# Patient Record
Sex: FEMALE | Race: WHITE | NOT HISPANIC OR LATINO | Employment: FULL TIME | URBAN - METROPOLITAN AREA
[De-identification: names, ages, dates, MRNs, and addresses within clinical notes are randomized per-mention and may not be internally consistent; named-entity substitution may affect disease eponyms.]

---

## 2017-06-14 ENCOUNTER — APPOINTMENT (OUTPATIENT)
Dept: RADIOLOGY | Facility: CLINIC | Age: 42
End: 2017-06-14

## 2017-06-14 ENCOUNTER — ALLSCRIPTS OFFICE VISIT (OUTPATIENT)
Dept: OTHER | Facility: OTHER | Age: 42
End: 2017-06-14

## 2017-06-14 DIAGNOSIS — M25.569 PAIN IN KNEE: ICD-10-CM

## 2017-06-14 PROCEDURE — 73562 X-RAY EXAM OF KNEE 3: CPT

## 2017-06-14 PROCEDURE — 73560 X-RAY EXAM OF KNEE 1 OR 2: CPT

## 2017-06-28 ENCOUNTER — ALLSCRIPTS OFFICE VISIT (OUTPATIENT)
Dept: OTHER | Facility: OTHER | Age: 42
End: 2017-06-28

## 2017-07-13 ENCOUNTER — ALLSCRIPTS OFFICE VISIT (OUTPATIENT)
Dept: OTHER | Facility: OTHER | Age: 42
End: 2017-07-13

## 2017-07-13 ENCOUNTER — APPOINTMENT (OUTPATIENT)
Dept: RADIOLOGY | Facility: MEDICAL CENTER | Age: 42
End: 2017-07-13

## 2017-07-13 DIAGNOSIS — M25.569 PAIN IN KNEE: ICD-10-CM

## 2017-07-13 PROCEDURE — 73562 X-RAY EXAM OF KNEE 3: CPT

## 2017-10-26 ENCOUNTER — TRANSCRIBE ORDERS (OUTPATIENT)
Dept: ADMINISTRATIVE | Facility: HOSPITAL | Age: 42
End: 2017-10-26

## 2017-10-26 DIAGNOSIS — Z12.39 SCREENING BREAST EXAMINATION: Primary | ICD-10-CM

## 2017-11-03 ENCOUNTER — HOSPITAL ENCOUNTER (OUTPATIENT)
Dept: RADIOLOGY | Facility: HOSPITAL | Age: 42
Discharge: HOME | End: 2017-11-03
Payer: COMMERCIAL

## 2017-11-03 DIAGNOSIS — Z12.39 SCREENING BREAST EXAMINATION: ICD-10-CM

## 2017-11-03 PROCEDURE — G0202 SCR MAMMO BI INCL CAD: HCPCS

## 2018-01-13 VITALS
HEIGHT: 63 IN | WEIGHT: 140 LBS | SYSTOLIC BLOOD PRESSURE: 101 MMHG | BODY MASS INDEX: 24.8 KG/M2 | HEART RATE: 89 BPM | DIASTOLIC BLOOD PRESSURE: 69 MMHG

## 2018-01-14 VITALS
SYSTOLIC BLOOD PRESSURE: 125 MMHG | DIASTOLIC BLOOD PRESSURE: 78 MMHG | WEIGHT: 140 LBS | BODY MASS INDEX: 24.8 KG/M2 | HEIGHT: 63 IN | HEART RATE: 79 BPM

## 2018-01-14 VITALS
HEART RATE: 83 BPM | SYSTOLIC BLOOD PRESSURE: 117 MMHG | HEIGHT: 63 IN | DIASTOLIC BLOOD PRESSURE: 78 MMHG | WEIGHT: 140 LBS | BODY MASS INDEX: 24.8 KG/M2

## 2018-12-28 ENCOUNTER — OFFICE VISIT (OUTPATIENT)
Dept: URGENT CARE | Facility: CLINIC | Age: 43
End: 2018-12-28
Payer: COMMERCIAL

## 2018-12-28 VITALS
HEART RATE: 94 BPM | BODY MASS INDEX: 22.5 KG/M2 | SYSTOLIC BLOOD PRESSURE: 100 MMHG | WEIGHT: 127 LBS | HEIGHT: 63 IN | TEMPERATURE: 97.3 F | DIASTOLIC BLOOD PRESSURE: 68 MMHG | RESPIRATION RATE: 19 BRPM | OXYGEN SATURATION: 99 %

## 2018-12-28 DIAGNOSIS — J01.90 ACUTE NON-RECURRENT SINUSITIS, UNSPECIFIED LOCATION: Primary | ICD-10-CM

## 2018-12-28 PROCEDURE — 99213 OFFICE O/P EST LOW 20 MIN: CPT | Performed by: PHYSICIAN ASSISTANT

## 2018-12-28 RX ORDER — SULFAMETHOXAZOLE AND TRIMETHOPRIM 800; 160 MG/1; MG/1
1 TABLET ORAL EVERY 12 HOURS SCHEDULED
Qty: 20 TABLET | Refills: 0 | Status: SHIPPED | OUTPATIENT
Start: 2018-12-28 | End: 2019-01-07

## 2018-12-28 RX ORDER — BUPROPION HYDROCHLORIDE 150 MG/1
TABLET, EXTENDED RELEASE ORAL
COMMUNITY

## 2018-12-28 NOTE — LETTER
December 28, 2018     Patient: Lauro Roe   YOB: 1975   Date of Visit: 12/28/2018       To Whom it May Concern:    Ann Marie Ramires was seen in my clinic on 12/28/2018  She may return to work on 12/29/2018  If you have any questions or concerns, please don't hesitate to call           Sincerely,          Daniel Zaragoza PA-C        CC: No Recipients

## 2018-12-28 NOTE — PROGRESS NOTES
[facility]        NAME: Kristina Flores is a 41 y o  female  : 1975    MRN: [id number]  DATE: 2019  TIME: 10:27 PM    Assessment and Plan   Acute non-recurrent sinusitis, unspecified location [J01 90]  1  Acute non-recurrent sinusitis, unspecified location  sulfamethoxazole-trimethoprim (BACTRIM DS) 800-160 mg per tablet         Patient Instructions     Discussed condition with pt  She has worsening sinus symptoms despite adequate OTC symptomatic management and she is a smoker  I will treat her for sinusitis with a course of Bactrim DS and rec hydration, rest, continuing with symptomatic management, and observation  Follow up with PCP in 3-5 days  Proceed to  ER if symptoms worsen  Chief Complaint     Chief Complaint   Patient presents with    Sinusitis     Pt reports of sinus congestion with pain and cough  S/S started approx 4 days ago  History of Present Illness       Pt presents with onset 4 days ago of what started as runny nose and HA and now HA has worsened  Tried Advil/Motrin/Aleve with no relief of HA  She has sinus pressure, dizziness, nasal congestion, PND, productive cough  Had sweats last night but denies fever, chills, N/V/D  Has also taken Mucinex, Sudafed Cold/Sinus, and Flonase  Has allergies and asthma  Has rescue inhaler to use PRN  Smokes 1/4 ppd  She has received the flu shot  Review of Systems   Review of Systems   Constitutional: Positive for diaphoresis  Negative for chills and fever  HENT: Positive for congestion, postnasal drip, sinus pain, sinus pressure and sore throat  Respiratory: Positive for cough  Cardiovascular: Negative  Gastrointestinal: Negative  Genitourinary: Negative  Neurological: Positive for headaches           Current Medications       Current Outpatient Prescriptions:     buPROPion (WELLBUTRIN SR) 150 mg 12 hr tablet, Take by mouth, Disp: , Rfl:     sulfamethoxazole-trimethoprim (BACTRIM DS) 800-160 mg per tablet, Take 1 tablet by mouth every 12 (twelve) hours for 10 days Take with food  , Disp: 20 tablet, Rfl: 0    Current Allergies     Allergies as of 12/28/2018 - Reviewed 12/28/2018   Allergen Reaction Noted    Penicillin v  06/14/2017            The following portions of the patient's history were reviewed and updated as appropriate: allergies, current medications, past family history, past medical history, past social history, past surgical history and problem list      History reviewed  No pertinent past medical history  History reviewed  No pertinent surgical history  History reviewed  No pertinent family history  Medications have been verified  Objective   /68   Pulse 94   Temp (!) 97 3 °F (36 3 °C)   Resp 19   Ht 5' 3" (1 6 m)   Wt 57 6 kg (127 lb)   LMP 12/17/2018 (LMP Unknown)   SpO2 99%   BMI 22 50 kg/m²        Physical Exam     Physical Exam   Constitutional: She is oriented to person, place, and time  She appears well-developed and well-nourished  No distress  Neurological: She is alert and oriented to person, place, and time  Psychiatric: She has a normal mood and affect  Vitals reviewed

## 2019-01-31 ENCOUNTER — OFFICE VISIT (OUTPATIENT)
Dept: URGENT CARE | Facility: CLINIC | Age: 44
End: 2019-01-31
Payer: COMMERCIAL

## 2019-01-31 VITALS
HEIGHT: 63 IN | OXYGEN SATURATION: 100 % | HEART RATE: 70 BPM | TEMPERATURE: 97.4 F | BODY MASS INDEX: 22.64 KG/M2 | RESPIRATION RATE: 18 BRPM | WEIGHT: 127.8 LBS | SYSTOLIC BLOOD PRESSURE: 100 MMHG | DIASTOLIC BLOOD PRESSURE: 60 MMHG

## 2019-01-31 DIAGNOSIS — J01.90 ACUTE RHINOSINUSITIS: Primary | ICD-10-CM

## 2019-01-31 PROBLEM — S80.02XA PATELLAR CONTUSION, LEFT, INITIAL ENCOUNTER: Status: ACTIVE | Noted: 2017-06-14

## 2019-01-31 PROBLEM — M25.569 KNEE PAIN: Status: ACTIVE | Noted: 2017-06-14

## 2019-01-31 PROCEDURE — 99213 OFFICE O/P EST LOW 20 MIN: CPT | Performed by: PHYSICIAN ASSISTANT

## 2019-01-31 RX ORDER — LORATADINE 10 MG/1
10 TABLET ORAL DAILY PRN
COMMUNITY

## 2019-01-31 RX ORDER — BIOTIN 10 MG
2 TABLET ORAL DAILY
COMMUNITY

## 2019-01-31 RX ORDER — FLUTICASONE PROPIONATE 50 MCG
1 SPRAY, SUSPENSION (ML) NASAL DAILY PRN
COMMUNITY

## 2019-01-31 RX ORDER — BIOTIN 1 MG
TABLET ORAL DAILY
COMMUNITY

## 2019-01-31 NOTE — PATIENT INSTRUCTIONS
Continue a decongestant such as Mucinex or Sudafed  Continue use of Flonase  Motrin or Tylenol may be used for fever and discomfort  Check your package labeling as some decongestants already contain these medications  Begin using a Neti pot as directed  Apply a warm compress to your face, jaw, and right shoulder for discomfort relief  Massage the shoulder to help release the muscle spasm  Use a cool mist humidifier at bedtime, turning on hours prior to bed with bedroom door shut for maximum relief  Follow up with your family doctor in 3-5 days  Proceed to the ER if symptoms worsen

## 2019-01-31 NOTE — LETTER
January 31, 2019     Patient: Sushil Warner   YOB: 1975   Date of Visit: 1/31/2019       To Whom It May Concern: It is my medical opinion that Krysta Miller may return to work on 02/01/2019  If you have any questions or concerns, please don't hesitate to call           Sincerely,    Coni Moreno PA-C

## 2019-01-31 NOTE — PROGRESS NOTES
[facility]        NAME: Ethan Jeong is a 41 y o  female  : 1975    MRN: [id number]  DATE: 2019  TIME: 9:02 AM    Assessment and Plan   Acute rhinosinusitis [J01 90]  1  Acute rhinosinusitis       Patient Instructions   Continue a decongestant such as Mucinex or Sudafed  Continue use of Flonase  Motrin or Tylenol may be used for fever and discomfort  Check your package labeling as some decongestants already contain these medications  Begin using a Neti pot as directed  Apply a warm compress to your face, jaw, and right shoulder for discomfort relief  Massage the shoulder to help release the muscle spasm  Use a cool mist humidifier at bedtime, turning on hours prior to bed with bedroom door shut for maximum relief  Follow up with your family doctor in 3-5 days  Proceed to the ER if symptoms worsen  Advised could take an allergy tablet  The diagnosis, viral etiology, expected course of illness, and treatment plan were reviewed  All questions answered  Precautions given  Patient verbalized understanding and agreement the treatment plan  Chief Complaint     Chief Complaint   Patient presents with    Cold Like Symptoms     x several days - nasal congestion with PND, facial pain (R jaw/teeth), HA, occ  dy cough  vomited x 1 (mucous)  Taking Motrin, Flonase, Mucinex DM and Sudafed PE    Facial Pain     History of Present Illness   40 y/o female presentign with c/o sinus infection x 4 days  She notes facial pressure behind the right eye, pressure in b/l cheeks with bending down, nasal congestion, mild runny nose, PND, dry cough, and a single episode of vomitted yesterday  She notes vomiting was likely related to a significant amount of PND and mucus swallowed early in the am  She notes seh vomited just once with mild preceding nausea  No further nausea, vomiting, or change in appetite  Treating with Sudafed, Mucinex, saline spray, and Flonase   Family sick at home with similar symptoms  No recent travel  Had flu shot  Smoker of 3-4 cigarettes per day  Patient does note a history of fall, spring, and summer seasonal allergies  She is wondering if she should begin taking an allergy tablet noting that she has been sick more frequently this winter and has lingering symptoms even when feeling better  Review of Systems   Review of Systems   Constitutional: Positive for diaphoresis and fatigue  Negative for appetite change, chills and fever  Respiratory: Negative for shortness of breath and wheezing  Cardiovascular: Negative for chest pain and palpitations  Gastrointestinal: Negative for abdominal pain, diarrhea and nausea  Musculoskeletal: Positive for myalgias (right shoulder  )  Negative for arthralgias  Skin: Negative for rash  Neurological: Negative for dizziness and light-headedness       Current Medications       Current Outpatient Prescriptions:     buPROPion (WELLBUTRIN SR) 150 mg 12 hr tablet, Take by mouth, Disp: , Rfl:     Cholecalciferol (VITAMIN D3) 1000 units CAPS, Take by mouth daily, Disp: , Rfl:     fluticasone (FLONASE) 50 mcg/act nasal spray, 1 spray into each nostril daily as needed for rhinitis, Disp: , Rfl:     loratadine (CLARITIN) 10 mg tablet, Take 10 mg by mouth daily as needed for allergies, Disp: , Rfl:     Multiple Vitamins-Minerals (HAIR SKIN AND NAILS FORMULA PO), Take by mouth daily, Disp: , Rfl:     Multiple Vitamins-Minerals (MULTIVITAMIN ADULT) CHEW, Chew 2 tablets daily, Disp: , Rfl:     Current Allergies     Allergies as of 01/31/2019 - Reviewed 01/31/2019   Allergen Reaction Noted    Penicillin v Hives 06/14/2017            The following portions of the patient's history were reviewed and updated as appropriate: allergies, current medications, past family history, past medical history, past social history, past surgical history and problem list      Past Medical History:   Diagnosis Date    Allergic rhinitis     Migraine  Vitamin D deficiency        Past Surgical History:   Procedure Laterality Date    LAPAROSCOPY FOR ECTOPIC PREGNANCY       No family history on file  Medications have been verified  Objective   /60 (BP Location: Left arm, Patient Position: Sitting, Cuff Size: Standard)   Pulse 70   Temp (!) 97 4 °F (36 3 °C) (Tympanic)   Resp 18   Ht 5' 3" (1 6 m)   Wt 58 kg (127 lb 12 8 oz)   LMP 12/23/2018 (Exact Date)   SpO2 100%   BMI 22 64 kg/m²      Physical Exam     Physical Exam   Constitutional: She is oriented to person, place, and time  Vital signs are normal  She appears well-developed and well-nourished  She is cooperative  She appears ill  No distress  HENT:   Head: Normocephalic and atraumatic  Right Ear: Hearing, external ear and ear canal normal  A middle ear effusion is present  Left Ear: Hearing, external ear and ear canal normal  A middle ear effusion is present  Nose: Rhinorrhea present  No mucosal edema  Mouth/Throat: Uvula is midline, oropharynx is clear and moist and mucous membranes are normal  Mucous membranes are not pale, not dry and not cyanotic  No oral lesions  No uvula swelling  No oropharyngeal exudate, posterior oropharyngeal edema, posterior oropharyngeal erythema or tonsillar abscesses  Erythematous nasal mucosa  Eyes: Conjunctivae are normal  Right eye exhibits no discharge  Left eye exhibits no discharge  No scleral icterus  Neck: Phonation normal  No neck rigidity  No edema and no erythema present  Cardiovascular: Normal rate, regular rhythm and normal heart sounds  Exam reveals no gallop and no friction rub  No murmur heard  Pulmonary/Chest: Effort normal and breath sounds normal  No stridor  No respiratory distress  She has no decreased breath sounds  She has no wheezes  She has no rhonchi  She has no rales  Abdominal: Soft  Bowel sounds are normal  She exhibits no distension and no mass  There is no tenderness  There is no guarding  Lymphadenopathy:     She has no cervical adenopathy  Neurological: She is alert and oriented to person, place, and time  No cranial nerve deficit  She exhibits normal muscle tone  Coordination normal    Skin: Skin is warm and dry  No rash noted  She is not diaphoretic  Psychiatric: She has a normal mood and affect  Her behavior is normal    Nursing note and vitals reviewed

## 2019-02-08 ENCOUNTER — TRANSCRIBE ORDERS (OUTPATIENT)
Dept: ADMINISTRATIVE | Facility: HOSPITAL | Age: 44
End: 2019-02-08

## 2019-02-08 DIAGNOSIS — Z12.39 SCREENING BREAST EXAMINATION: Primary | ICD-10-CM

## 2019-02-14 ENCOUNTER — HOSPITAL ENCOUNTER (OUTPATIENT)
Dept: RADIOLOGY | Facility: HOSPITAL | Age: 44
Discharge: HOME | End: 2019-02-14
Payer: COMMERCIAL

## 2019-02-14 VITALS — HEIGHT: 63 IN | WEIGHT: 127 LBS | BODY MASS INDEX: 22.5 KG/M2

## 2019-02-14 DIAGNOSIS — Z12.39 SCREENING BREAST EXAMINATION: ICD-10-CM

## 2019-02-14 PROCEDURE — 77067 SCR MAMMO BI INCL CAD: CPT

## 2019-02-14 PROCEDURE — 77063 BREAST TOMOSYNTHESIS BI: CPT

## 2019-06-30 ENCOUNTER — OFFICE VISIT (OUTPATIENT)
Dept: URGENT CARE | Facility: CLINIC | Age: 44
End: 2019-06-30
Payer: COMMERCIAL

## 2019-06-30 VITALS
TEMPERATURE: 98.7 F | HEART RATE: 82 BPM | BODY MASS INDEX: 21.83 KG/M2 | OXYGEN SATURATION: 100 % | SYSTOLIC BLOOD PRESSURE: 100 MMHG | HEIGHT: 63 IN | WEIGHT: 123.2 LBS | DIASTOLIC BLOOD PRESSURE: 60 MMHG | RESPIRATION RATE: 18 BRPM

## 2019-06-30 DIAGNOSIS — J01.00 ACUTE NON-RECURRENT MAXILLARY SINUSITIS: Primary | ICD-10-CM

## 2019-06-30 PROCEDURE — 99214 OFFICE O/P EST MOD 30 MIN: CPT | Performed by: NURSE PRACTITIONER

## 2019-06-30 RX ORDER — DOXYCYCLINE HYCLATE 100 MG/1
100 CAPSULE ORAL EVERY 12 HOURS SCHEDULED
Qty: 20 CAPSULE | Refills: 0 | Status: SHIPPED | OUTPATIENT
Start: 2019-06-30 | End: 2019-07-10

## 2019-08-28 ENCOUNTER — OFFICE VISIT (OUTPATIENT)
Dept: URGENT CARE | Facility: CLINIC | Age: 44
End: 2019-08-28
Payer: COMMERCIAL

## 2019-08-28 VITALS
HEART RATE: 63 BPM | TEMPERATURE: 97 F | WEIGHT: 129.8 LBS | SYSTOLIC BLOOD PRESSURE: 105 MMHG | DIASTOLIC BLOOD PRESSURE: 64 MMHG | BODY MASS INDEX: 23 KG/M2 | RESPIRATION RATE: 16 BRPM | HEIGHT: 63 IN | OXYGEN SATURATION: 100 %

## 2019-08-28 DIAGNOSIS — B97.89 VIRAL SINUSITIS: ICD-10-CM

## 2019-08-28 DIAGNOSIS — J32.9 VIRAL SINUSITIS: ICD-10-CM

## 2019-08-28 DIAGNOSIS — G44.209 ACUTE NON INTRACTABLE TENSION-TYPE HEADACHE: Primary | ICD-10-CM

## 2019-08-28 PROCEDURE — 99213 OFFICE O/P EST LOW 20 MIN: CPT | Performed by: PHYSICIAN ASSISTANT

## 2019-08-28 RX ORDER — KETOROLAC TROMETHAMINE 30 MG/ML
30 INJECTION, SOLUTION INTRAMUSCULAR; INTRAVENOUS ONCE
Status: COMPLETED | OUTPATIENT
Start: 2019-08-28 | End: 2019-08-28

## 2019-08-28 RX ADMIN — KETOROLAC TROMETHAMINE 30 MG: 30 INJECTION, SOLUTION INTRAMUSCULAR; INTRAVENOUS at 08:51

## 2019-08-28 NOTE — LETTER
August 28, 2019     Patient: Tere Haile   YOB: 1975   Date of Visit: 8/28/2019       To Whom It May Concern: It is my medical opinion that Marion Jorge may return to work on 8/30/2019 or sooner if feeling better  If you have any questions or concerns, please don't hesitate to call           Sincerely,        Angel Edgar PA-C

## 2019-08-28 NOTE — PATIENT INSTRUCTIONS
Continue ibuprofen or Tylenol for discomfort relief  Your next dose of ibuprofen may be taken at 6:00 p m  today  Begin taking Mucinex for nasal congestion  Continue to apply ice or heat to your face and neck for discomfort relief  After heat application, massage and stretch your neck as reviewed  Get plenty of rest and continue to drink lots of water  Follow up with your family doctor in 3-5 days  Proceed to the ER with change in vision, change in speech, weakness, or if symptoms worsen

## 2019-08-28 NOTE — PROGRESS NOTES
[facility]        NAME: Dioni Castañeda is a 41 y o  female  : 1975    MRN: [id number]  DATE: 2019  TIME: 9:04 AM    Assessment and Plan   Acute non intractable tension-type headache [G44 209]  1  Acute non intractable tension-type headache  ketorolac (TORADOL) injection 30 mg   2  Viral sinusitis       Patient Instructions   Continue ibuprofen or Tylenol for discomfort relief  Your next dose of ibuprofen may be taken at 6:00 p m  today  Begin taking Mucinex for nasal congestion  Continue to apply ice or heat to your face and neck for discomfort relief  After heat application, massage and stretch your neck as reviewed  Get plenty of rest and continue to drink lots of water  Follow up with your family doctor in 3-5 days  Proceed to the ER with change in vision, change in speech, weakness, or if symptoms worsen  The diagnosis, etiology, expected course of illness, and treatment plan were reviewed  All questions answered  Precautions given  Patient verbalized understanding and agreement with the treatment plan  Chief Complaint     Chief Complaint   Patient presents with    Facial Pain     has facial, neck, head pain since yesterday tried OTC meds  stayed in bed yesterday     History of Present Illness     45-year-old female presenting with complaint of headache x 1 5 days  HA is described as a heavy pressure and tightness over her face, extending to back of head  It does not radiate  She reports symptoms of neck muscle stifness and spasm, off balance with first standing--relieved within seconds, sensitivity to light, fatigue, and malaise  Yesterday developed nasal congestion, sinus pressure, and sweats  No F/C/S, ear pain, RN, sore throat, cough, abd pain, N/V  No sensitivity to noise, change in vision, numbness, tingling, or weakness   She has attempted treating with ice and heat application to the face and neck with some relief, as well as with Aleve, Advil cold and sinus, Excedrin Migraine, with decrease in severity of pain  She reports very brief to no resolution of HA since onset  Reports a hx of migraines, with frequency decreased significantly since starting Wellbutrin for smoking sensation  She is hydrating with 6-8 glasses of water daily, and notes increased sleep since sx onset  No sick contacts  No recent travel  Nonsmoker  Review of Systems   Review of Systems   Constitutional: Positive for appetite change, diaphoresis and fatigue  Negative for chills and fever  HENT: Positive for congestion  Negative for ear pain, rhinorrhea, sneezing and sore throat  Respiratory: Negative for cough, shortness of breath and wheezing  Cardiovascular: Negative for chest pain and palpitations  Gastrointestinal: Negative for abdominal pain, diarrhea, nausea and vomiting  Musculoskeletal: Positive for myalgias  Skin: Negative for rash  Neurological: Positive for light-headedness and headaches  Negative for syncope, facial asymmetry, speech difficulty, weakness and numbness  Psychiatric/Behavioral: Negative for confusion and decreased concentration  Current Medications       Current Outpatient Medications:     buPROPion (WELLBUTRIN SR) 150 mg 12 hr tablet, Take by mouth, Disp: , Rfl:     Cholecalciferol (VITAMIN D3) 1000 units CAPS, Take by mouth daily, Disp: , Rfl:     fluticasone (FLONASE) 50 mcg/act nasal spray, 1 spray into each nostril daily as needed for rhinitis, Disp: , Rfl:     loratadine (CLARITIN) 10 mg tablet, Take 10 mg by mouth daily as needed for allergies, Disp: , Rfl:     Multiple Vitamins-Minerals (HAIR SKIN AND NAILS FORMULA PO), Take by mouth daily, Disp: , Rfl:     Multiple Vitamins-Minerals (MULTIVITAMIN ADULT) CHEW, Chew 2 tablets daily, Disp: , Rfl:   No current facility-administered medications for this visit       Current Allergies     Allergies as of 08/28/2019 - Reviewed 08/28/2019   Allergen Reaction Noted    Penicillin v Hives 06/14/2017            The following portions of the patient's history were reviewed and updated as appropriate: allergies, current medications, past family history, past medical history, past social history, past surgical history and problem list      Past Medical History:   Diagnosis Date    Allergic rhinitis     Migraine     Vitamin D deficiency        Past Surgical History:   Procedure Laterality Date    LAPAROSCOPY FOR ECTOPIC PREGNANCY         Family History   Problem Relation Age of Onset    Colon cancer Maternal Aunt 55     Medications have been verified  Objective   /64   Pulse 63   Temp (!) 97 °F (36 1 °C)   Resp 16   Ht 5' 3" (1 6 m)   Wt 58 9 kg (129 lb 12 8 oz)   LMP 08/24/2019   SpO2 100%   BMI 22 99 kg/m²      Physical Exam     Physical Exam   Constitutional: She is oriented to person, place, and time  Vital signs are normal  She appears well-developed and well-nourished  She is cooperative  She does not appear ill  No distress  HENT:   Head: Normocephalic and atraumatic  Right Ear: Hearing, tympanic membrane, external ear and ear canal normal  No middle ear effusion  Left Ear: Hearing, tympanic membrane, external ear and ear canal normal   No middle ear effusion  Nose: Nose normal  No rhinorrhea  Mouth/Throat: Uvula is midline, oropharynx is clear and moist and mucous membranes are normal  Mucous membranes are not pale, not dry and not cyanotic  No oral lesions  No uvula swelling  No oropharyngeal exudate, posterior oropharyngeal edema, posterior oropharyngeal erythema or tonsillar abscesses  Tonsils are 1+ on the right  Tonsils are 1+ on the left  No tonsillar exudate  Mild mucosal edema and nasal congestion present  Eyes: Pupils are equal, round, and reactive to light  Conjunctivae, EOM and lids are normal  Right eye exhibits no discharge and no exudate  Left eye exhibits no discharge and no exudate  Neck: Trachea normal and phonation normal  Neck supple   No tracheal tenderness present  No neck rigidity  No edema and no erythema present  Cardiovascular: Normal rate, regular rhythm and normal heart sounds  Exam reveals no gallop, no distant heart sounds and no friction rub  No murmur heard  Pulmonary/Chest: Effort normal and breath sounds normal  No stridor  No respiratory distress  She has no decreased breath sounds  She has no wheezes  She has no rhonchi  She has no rales  Abdominal: Soft  Bowel sounds are normal  She exhibits no distension and no mass  There is no tenderness  There is no rigidity, no rebound and no guarding  Lymphadenopathy:     She has cervical adenopathy (Tender throughout anterior and posterior chains  )  Neurological: She is alert and oriented to person, place, and time  She has normal strength  She is not disoriented  No cranial nerve deficit  She exhibits normal muscle tone  She displays a negative Romberg sign  Coordination and gait normal    Skin: Skin is warm, dry and intact  No rash noted  She is not diaphoretic  No erythema  No pallor  Psychiatric: She has a normal mood and affect  Her behavior is normal  Judgment and thought content normal    Nursing note and vitals reviewed

## 2019-09-17 ENCOUNTER — OFFICE VISIT (OUTPATIENT)
Dept: URGENT CARE | Facility: CLINIC | Age: 44
End: 2019-09-17
Payer: COMMERCIAL

## 2019-09-17 VITALS
SYSTOLIC BLOOD PRESSURE: 100 MMHG | HEART RATE: 75 BPM | TEMPERATURE: 98.8 F | HEIGHT: 62 IN | DIASTOLIC BLOOD PRESSURE: 60 MMHG | BODY MASS INDEX: 23.22 KG/M2 | OXYGEN SATURATION: 100 % | RESPIRATION RATE: 18 BRPM | WEIGHT: 126.2 LBS

## 2019-09-17 DIAGNOSIS — T63.451A HORNET STING, ACCIDENTAL OR UNINTENTIONAL, INITIAL ENCOUNTER: Primary | ICD-10-CM

## 2019-09-17 PROCEDURE — 99213 OFFICE O/P EST LOW 20 MIN: CPT | Performed by: PHYSICIAN ASSISTANT

## 2019-09-17 RX ORDER — ALPRAZOLAM 0.25 MG/1
TABLET ORAL
COMMUNITY
Start: 2019-08-30

## 2019-09-17 RX ORDER — METHYLPREDNISOLONE SODIUM SUCCINATE 40 MG/ML
80 INJECTION, POWDER, LYOPHILIZED, FOR SOLUTION INTRAMUSCULAR; INTRAVENOUS ONCE
Status: COMPLETED | OUTPATIENT
Start: 2019-09-17 | End: 2019-09-17

## 2019-09-17 RX ADMIN — METHYLPREDNISOLONE SODIUM SUCCINATE 80 MG: 40 INJECTION, POWDER, LYOPHILIZED, FOR SOLUTION INTRAMUSCULAR; INTRAVENOUS at 17:25

## 2019-09-17 NOTE — LETTER
September 17, 2019     Patient: Monalisa Meenu   YOB: 1975   Date of Visit: 9/17/2019       To Whom it May Concern:    Aliya Oconnor was seen in my clinic on 9/17/2019  She may return to work on 9/18/2019  If you have any questions or concerns, please don't hesitate to call  Sincerely,          Tiara Vences PA-C        CC:  Monalisa Corrigan

## 2019-09-17 NOTE — PATIENT INSTRUCTIONS
Insect Bite or Sting   WHAT YOU NEED TO KNOW:   Most insect bites and stings are not dangerous and go away without treatment  Your symptoms may be mild, or you may develop anaphylaxis  Anaphylaxis is a sudden, life-threatening reaction that needs immediate treatment  Common examples of insects that bite or sting are bees, ticks, mosquitoes, spiders, and ants  Insect bites or stings can lead to diseases such as malaria, West Nile virus, Lyme disease, or [place] Spotted Fever  DISCHARGE INSTRUCTIONS:   Call 911 for signs or symptoms of anaphylaxis,  such as trouble breathing, swelling in your mouth or throat, or wheezing  You may also have itching, a rash, hives, or feel like you are going to faint  Return to the emergency department if:   · You are stung on your tongue or in your throat  · A white area forms around the bite  · You are sweating badly or have body pain  · You think you were bitten or stung by a poisonous insect  Contact your healthcare provider if:   · You have a fever  · The area becomes red, warm, tender, and swollen beyond the area of the bite or sting  · You have questions or concerns about your condition or care  Medicines:   · Antihistamines  decrease itching and rash  · Epinephrine  is used to treat severe allergic reactions such as anaphylaxis  · Take your medicine as directed  Contact your healthcare provider if you think your medicine is not helping or if you have side effects  Tell him of her if you are allergic to any medicine  Keep a list of the medicines, vitamins, and herbs you take  Include the amounts, and when and why you take them  Bring the list or the pill bottles to follow-up visits  Carry your medicine list with you in case of an emergency  Steps to take for signs or symptoms of anaphylaxis:   · Immediately  give 1 shot of epinephrine only into the outer thigh muscle  · Leave the shot in place  as directed   Your healthcare provider may recommend you leave it in place for up to 10 seconds before you remove it  This helps make sure all of the epinephrine is delivered  · Call 911 and go to the emergency department,  even if the shot improved symptoms  Do not drive yourself  Bring the used epinephrine shot with you  Safety precautions to take if you are at risk for anaphylaxis:   · Keep 2 shots of epinephrine with you at all times  You may need a second shot, because epinephrine only works for about 20 minutes and symptoms may return  Your healthcare provider can show you and family members how to give the shot  Check the expiration date every month and replace it before it expires  · Create an action plan  Your healthcare provider can help you create a written plan that explains the allergy and an emergency plan to treat a reaction  The plan explains when to give a second epinephrine shot if symptoms return or do not improve after the first  Give copies of the action plan and emergency instructions to family members, work and school staff, and  providers  Show them how to give a shot of epinephrine  · Carry medical alert identification  Wear medical alert jewelry or carry a card that says you have an insect allergy  Ask your healthcare provider where to get these items  If an insect bites or stings you:   · Remove the stinger  Scrape the stinger out with your fingernail, edge of a credit card, or a knife blade  Do not squeeze the wound  Gently wash the area with soap and water  · Remove the tick  Ticks must be removed as soon as possible so you do not get diseases passed through tick bites  Ask your healthcare provider for more information on tick bites and how to remove ticks  Care for a bite or sting wound:   · Elevate the affected area  Prop the wound above the level of your heart, if possible  Elevate the area for 10 to 20 minutes each hour or as directed by your healthcare provider  · Use compresses    Soak a clean washcloth in cold water, wring it out, and put it on the bite or sting  Use the compress for 10 to 20 minutes each hour or as directed by your healthcare provider  After 24 to 48 hours, change to warm compresses  · Apply a paste  Add water to baking soda to make a thick paste  Put the paste on the area for 5 minutes  Rinse gently to remove the paste  Prevent another insect bite or sting:   · Do not wear bright-colored or flower-print clothing when you plan to spend time outdoors  Do not use hairspray, perfumes, or aftershave  · Do not leave food out  · Empty any standing water and wash container with soap and water every 2 days  · Put screens on all open windows and doors  · Put insect repellent that contains DEET on skin that is showing when you go outside  Put insect repellent at the top of your boots, bottom of pant legs, and sleeve cuffs  Wear long sleeves, pants, and shoes  · Use citronella candles outdoors to help keep mosquitoes away  Put a tick and flea collar on pets  Follow up with your healthcare provider as directed:  Write down your questions so you remember to ask them during your visits  © 2017 [employer] Information is for End User's use only and may not be sold, redistributed or otherwise used for commercial purposes  All illustrations and images included in CareNotes® are the copyrighted property of [employer]  or Emeka Stoll  The above information is an  only  It is not intended as medical advice for individual conditions or treatments  Talk to your doctor, nurse or pharmacist before following any medical regimen to see if it is safe and effective for you

## 2019-09-17 NOTE — PROGRESS NOTES
[facility]        NAME: Tyesha Camargo is a 41 y o  female  : 1975    MRN: [id number]  DATE: 2019  TIME: 2:50 PM    Assessment and Plan   Hornet sting, accidental or unintentional, initial encounter [T63 451A]  1  Hornet sting, accidental or unintentional, initial encounter  methylPREDNISolone sodium succinate (Solu-MEDROL) injection 80 mg         Patient Instructions     Discussed condition with pt  She is having local reaction to hornet sting  Will treat her with a Solu-Medrol IM injection and rec PO Benadryl as well as close observation  Persistent or worsening condition should be reevaluation  Follow up with PCP in 3-5 days  Proceed to  ER if symptoms worsen  Chief Complaint     Chief Complaint   Patient presents with   Jakub Chand 85     was bit/stung by hornet yesterday during a hike - outer R knee and inner R ankle  Used ice and taking Benadryl po q 4 hours (last at 12:30) and Benadryl spray  Takes Claritin daily  Areas are red, swollen itchy and painful  History of Present Illness       Pt presents after she believes she was stung by a hornet yesterday on her RLE  She was hiking when it occurred  Following the sting, the affected area became swollen, red and accompanied by itching and discomfort  Denies seeing a stinger  Denies fever, chills, SOB/wheezing, lip/tongue/throat swelling or tingling, or any other symptoms  Has applied topical Benadryl and taken Benadryl PO  Takes Claritin daily for allergies  Review of Systems   Review of Systems   Constitutional: Negative  HENT: Negative  Respiratory: Negative  Cardiovascular: Negative  Gastrointestinal: Negative  Genitourinary: Negative  Skin: Positive for wound (Insect bite/sting RLE)           Current Medications       Current Outpatient Medications:     ALPRAZolam (XANAX) 0 25 mg tablet, daily at bedtime as needed, Disp: , Rfl:     buPROPion (WELLBUTRIN SR) 150 mg 12 hr tablet, Take by mouth, Disp: , Rfl:     Cholecalciferol (VITAMIN D3) 1000 units CAPS, Take by mouth daily, Disp: , Rfl:     fluticasone (FLONASE) 50 mcg/act nasal spray, 1 spray into each nostril daily as needed for rhinitis, Disp: , Rfl:     loratadine (CLARITIN) 10 mg tablet, Take 10 mg by mouth daily as needed for allergies, Disp: , Rfl:     Multiple Vitamins-Minerals (HAIR SKIN AND NAILS FORMULA PO), Take by mouth daily, Disp: , Rfl:     Multiple Vitamins-Minerals (MULTIVITAMIN ADULT) CHEW, Chew 2 tablets daily, Disp: , Rfl:     PROAIR  (90 Base) MCG/ACT inhaler, every 4 (four) hours as needed, Disp: , Rfl:     Current Allergies     Allergies as of 09/17/2019 - Reviewed 09/17/2019   Allergen Reaction Noted    Penicillin v Hives 06/14/2017            The following portions of the patient's history were reviewed and updated as appropriate: allergies, current medications, past family history, past medical history, past social history, past surgical history and problem list      Past Medical History:   Diagnosis Date    Allergic rhinitis     Migraine     Vitamin D deficiency        Past Surgical History:   Procedure Laterality Date    LAPAROSCOPY FOR ECTOPIC PREGNANCY         Family History   Problem Relation Age of Onset    Colon cancer Maternal Aunt 55         Medications have been verified  Objective   /60 (BP Location: Left arm, Patient Position: Sitting, Cuff Size: Standard)   Pulse 75   Temp 98 8 °F (37 1 °C) (Tympanic)   Resp 18   Ht 5' 2" (1 575 m)   Wt 57 2 kg (126 lb 3 2 oz)   LMP 08/24/2019   SpO2 100%   BMI 23 08 kg/m²        Physical Exam     Physical Exam   Constitutional: She is oriented to person, place, and time  She appears well-developed and well-nourished  No distress  HENT:   Mouth/Throat: Uvula is midline, oropharynx is clear and moist and mucous membranes are normal  No uvula swelling  Neck: Neck supple     Cardiovascular: Normal rate, regular rhythm and normal heart sounds  No murmur heard  Pulmonary/Chest: Effort normal and breath sounds normal    Lymphadenopathy:     She has no cervical adenopathy  Neurological: She is alert and oriented to person, place, and time  Skin:   Right lower leg with two separate areas of erythema, increased warmth to the touch, mild STS, and tenderness surrounding two insect bite stings/bite wounds; no visible FB; no visible abscess formation  Psychiatric: She has a normal mood and affect  Vitals reviewed

## 2020-02-26 ENCOUNTER — OFFICE VISIT (OUTPATIENT)
Dept: URGENT CARE | Facility: CLINIC | Age: 45
End: 2020-02-26
Payer: COMMERCIAL

## 2020-02-26 VITALS
SYSTOLIC BLOOD PRESSURE: 106 MMHG | HEIGHT: 63 IN | RESPIRATION RATE: 18 BRPM | OXYGEN SATURATION: 100 % | BODY MASS INDEX: 22.79 KG/M2 | DIASTOLIC BLOOD PRESSURE: 69 MMHG | TEMPERATURE: 99.1 F | HEART RATE: 65 BPM | WEIGHT: 128.6 LBS

## 2020-02-26 DIAGNOSIS — J01.90 ACUTE NON-RECURRENT SINUSITIS, UNSPECIFIED LOCATION: Primary | ICD-10-CM

## 2020-02-26 PROCEDURE — 99213 OFFICE O/P EST LOW 20 MIN: CPT | Performed by: PHYSICIAN ASSISTANT

## 2020-02-26 RX ORDER — DOXYCYCLINE 100 MG/1
100 TABLET ORAL 2 TIMES DAILY
Qty: 14 TABLET | Refills: 0 | Status: SHIPPED | OUTPATIENT
Start: 2020-02-26 | End: 2020-03-04

## 2020-02-26 NOTE — LETTER
February 26, 2020     Patient: Shahnaz Marshall   YOB: 1975   Date of Visit: 2/26/2020       To Whom It May Concern: It is my medical opinion that Haleigh Canas may return to work on 2/28/2020 or sooner if feeling better  If you have any questions or concerns, please don't hesitate to call           Sincerely,        Lena Medina PA-C

## 2020-02-26 NOTE — PROGRESS NOTES
[facility]        NAME: Nicki Cowart is a 45 y o  female  : 1975    MRN: [id number]  DATE: 2020  TIME: 2:11 PM    Assessment and Plan   Acute non-recurrent sinusitis, unspecified location [J01 90]  1  Acute non-recurrent sinusitis, unspecified location  doxycycline (ADOXA) 100 MG tablet     Patient Instructions     Take the antibiotic as directed with food and water  Take a probiotic while taking this medication  This medication can increase your chances for sunburn  Wear an SPF of 30 or higher with all sun exposure and reapply frequently  Do not take your multivitamin while on this antibiotic  Continue Claritin  Nasal saline spray to rinse the nasal passages  Hold Flonase for the next few days to decrease nose bleeds  When nose bleeds occur whole direct pressure to nostrils for 15 continuous minutes  If bleeding persists seek evaluation immediately  Follow up with family doctor in 3-5 days  Proceed to the ER if symptoms worsen  Chief Complaint     Chief Complaint   Patient presents with   Syl Creed     Began feeling sick on the 4th and worsened by 14th  The nose bleeds began,congestion and headaches   Headache     History of Present Illness     46-year-old female presenting with complaint of sinus headache x3 weeks  Reports a constant pain behind her right eye  Pain is increased with bending over which increases nasal congestion in often times causes dizziness  Reports right-sided ear pain  Has felt feverish over the past few days noting T-max 99 6°  No chills, sweats, fatigue, runny nose, or sore throat  Denies cough  Has attempted treating with her daily Claritin which she takes for seasonal allergies  Has also treated with Flonase but notes this is causing nose bleeds  No other treatments tried  No sick contacts or recent travel  Review of Systems   Review of Systems   Constitutional: Positive for fever   Negative for chills, diaphoresis and fatigue  HENT: Positive for congestion, ear pain and nosebleeds  Negative for rhinorrhea and sore throat  Respiratory: Negative for cough, shortness of breath and wheezing  Gastrointestinal: Negative for abdominal pain, diarrhea, nausea and vomiting  Musculoskeletal: Negative for myalgias  Skin: Negative for color change  Neurological: Positive for headaches       Current Medications       Current Outpatient Medications:     ALPRAZolam (XANAX) 0 25 mg tablet, daily at bedtime as needed, Disp: , Rfl:     buPROPion (WELLBUTRIN SR) 150 mg 12 hr tablet, Take by mouth, Disp: , Rfl:     Cholecalciferol (VITAMIN D3) 1000 units CAPS, Take by mouth daily, Disp: , Rfl:     fluticasone (FLONASE) 50 mcg/act nasal spray, 1 spray into each nostril daily as needed for rhinitis, Disp: , Rfl:     loratadine (CLARITIN) 10 mg tablet, Take 10 mg by mouth daily as needed for allergies, Disp: , Rfl:     Multiple Vitamins-Minerals (HAIR SKIN AND NAILS FORMULA PO), Take by mouth daily, Disp: , Rfl:     Multiple Vitamins-Minerals (MULTIVITAMIN ADULT) CHEW, Chew 2 tablets daily, Disp: , Rfl:     PROAIR  (90 Base) MCG/ACT inhaler, every 4 (four) hours as needed, Disp: , Rfl:     doxycycline (ADOXA) 100 MG tablet, Take 1 tablet (100 mg total) by mouth 2 (two) times a day for 7 days, Disp: 14 tablet, Rfl: 0    Current Allergies     Allergies as of 02/26/2020 - Reviewed 02/26/2020   Allergen Reaction Noted    Penicillin v Hives 06/14/2017          The following portions of the patient's history were reviewed and updated as appropriate: allergies, current medications, past family history, past medical history, past social history, past surgical history and problem list      Past Medical History:   Diagnosis Date    Allergic rhinitis     Migraine     Vitamin D deficiency        Past Surgical History:   Procedure Laterality Date    LAPAROSCOPY FOR ECTOPIC PREGNANCY         Family History   Problem Relation Age of Onset    Colon cancer Maternal Aunt 55     Medications have been verified  Objective   /69   Pulse 65   Temp 99 1 °F (37 3 °C)   Resp 18   Ht 5' 3" (1 6 m)   Wt 58 3 kg (128 lb 9 6 oz)   SpO2 100%   BMI 22 78 kg/m²       Physical Exam     Physical Exam   Constitutional: Vital signs are normal  She appears well-developed and well-nourished  She is cooperative  She does not appear ill  No distress  HENT:   Head: Normocephalic and atraumatic  Right Ear: Hearing, tympanic membrane, external ear and ear canal normal    Left Ear: Hearing, tympanic membrane, external ear and ear canal normal    Nose: Rhinorrhea present  Right sinus exhibits maxillary sinus tenderness  Mouth/Throat: Uvula is midline, oropharynx is clear and moist and mucous membranes are normal  Mucous membranes are not pale, not dry and not cyanotic  No oral lesions  No uvula swelling  No oropharyngeal exudate, posterior oropharyngeal edema, posterior oropharyngeal erythema or tonsillar abscesses  Eyes: Conjunctivae and lids are normal  Right eye exhibits no discharge and no exudate  Left eye exhibits no discharge and no exudate  Neck: Trachea normal and phonation normal  Neck supple  No tracheal tenderness present  No neck rigidity  No edema and no erythema present  Cardiovascular: Normal rate, regular rhythm and normal heart sounds  Exam reveals no distant heart sounds  Pulmonary/Chest: Effort normal and breath sounds normal  No stridor  No respiratory distress  She has no decreased breath sounds  She has no wheezes  She has no rhonchi  She has no rales  Abdominal: Soft  Bowel sounds are normal  She exhibits no distension and no mass  There is no tenderness  There is no rigidity, no rebound and no guarding  Lymphadenopathy:     She has no cervical adenopathy  Neurological: She is alert  She is not disoriented  No cranial nerve deficit  Coordination and gait normal    Skin: Skin is warm, dry and intact   No rash noted  She is not diaphoretic  No erythema  No pallor  Psychiatric: She has a normal mood and affect  Her behavior is normal  Judgment and thought content normal    Nursing note and vitals reviewed

## 2020-02-26 NOTE — PATIENT INSTRUCTIONS
Take the antibiotic as directed with food and water  Take a probiotic while taking this medication  This medication can increase your chances for sunburn  Wear an SPF of 30 or higher with all sun exposure and reapply frequently  Do not take your multivitamin while on this antibiotic  Continue Claritin  Nasal saline spray to rinse the nasal passages  Hold Flonase for the next few days to decrease nose bleeds  When nose bleeds occur whole direct pressure to nostrils for 15 continuous minutes  If bleeding persists seek evaluation immediately  Follow up with family doctor in 3-5 days  Proceed to the ER if symptoms worsen

## 2020-05-14 ENCOUNTER — TRANSCRIBE ORDERS (OUTPATIENT)
Dept: ADMINISTRATIVE | Facility: HOSPITAL | Age: 45
End: 2020-05-14

## 2020-05-14 DIAGNOSIS — Z12.31 VISIT FOR SCREENING MAMMOGRAM: Primary | ICD-10-CM

## 2020-05-18 ENCOUNTER — HOSPITAL ENCOUNTER (OUTPATIENT)
Dept: RADIOLOGY | Facility: HOSPITAL | Age: 45
Discharge: HOME | End: 2020-05-18
Payer: COMMERCIAL

## 2020-05-18 VITALS — BODY MASS INDEX: 22.68 KG/M2 | HEIGHT: 63 IN | WEIGHT: 128 LBS

## 2020-05-18 DIAGNOSIS — Z12.31 VISIT FOR SCREENING MAMMOGRAM: ICD-10-CM

## 2020-05-18 PROCEDURE — 77067 SCR MAMMO BI INCL CAD: CPT

## 2020-05-18 PROCEDURE — 77063 BREAST TOMOSYNTHESIS BI: CPT

## 2020-05-19 ENCOUNTER — TELEPHONE (OUTPATIENT)
Dept: OBGYN CLINIC | Facility: HOSPITAL | Age: 45
End: 2020-05-19